# Patient Record
Sex: FEMALE | Race: WHITE | Employment: FULL TIME | URBAN - METROPOLITAN AREA
[De-identification: names, ages, dates, MRNs, and addresses within clinical notes are randomized per-mention and may not be internally consistent; named-entity substitution may affect disease eponyms.]

---
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
## 2024-04-29 RX ORDER — DIMENHYDRINATE 50 MG
TABLET ORAL
COMMUNITY

## 2024-04-29 RX ORDER — CETIRIZINE HYDROCHLORIDE 10 MG/1
TABLET ORAL
COMMUNITY

## 2024-04-29 RX ORDER — LORATADINE 10 MG/1
10 TABLET ORAL DAILY
COMMUNITY

## 2024-04-30 ENCOUNTER — OFFICE VISIT (OUTPATIENT)
Dept: ORTHOPEDIC SURGERY | Age: 19
End: 2024-04-30

## 2024-04-30 DIAGNOSIS — M67.00 TIGHTNESS OF HEEL CORD, UNSPECIFIED LATERALITY: ICD-10-CM

## 2024-04-30 DIAGNOSIS — M77.42 METATARSALGIA, LEFT FOOT: ICD-10-CM

## 2024-04-30 DIAGNOSIS — M79.672 LEFT FOOT PAIN: Primary | ICD-10-CM

## 2024-04-30 NOTE — PROGRESS NOTES
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 female presents with the above complaint. The symptoms began 2 monthsago and started without an injury.  She continues to play collegiate basketball.  The patient describes a sharp, aching pain that does not radiate.  The symptoms are intermittent  and are cycling since the onset.       Pain localizes to the plantar aspect of the forefoot and  is not predictably aggravated by weightbearing. There are not mechanical symptoms associated with the symptoms. There are notneuritic symptoms involving the foot or ankle. The patient denies subjective instability about the foot or ankle and denies new onset or progressive weakness of the lower extremity.    Pain level 6    The patient denies a pattern of activity related swelling.      Treatment to date:none    There is no no prior history of foot or ankle trauma on the left.  Past history significant for sesamoiditis on the right    There is no prior history of autoimmune disease, crystal arthropathy, or crystal arthropathy.       Past Medical History:      No past medical history on file.    No past surgical history on file.      Present Medications:         Current Outpatient Medications   Medication Sig Dispense Refill    cetirizine (ZYRTEC) 10 MG tablet       dimenhyDRINATE (DRAMAMINE) 50 MG tablet Take by mouth      loratadine (CLARITIN) 10 MG tablet Take 1 tablet by mouth daily      pantoprazole (PROTONIX) 40 MG tablet TAKE 1 TABLET BY MOUTH EVERY DAY FOR 30 DAYS      rifAXIMin (XIFAXAN) 550 MG tablet Take 1 tablet by mouth 3 times daily       No current facility-administered medications for this visit.         Allergies:      No

## 2024-06-11 ENCOUNTER — TELEPHONE (OUTPATIENT)
Dept: ORTHOPEDIC SURGERY | Age: 19
End: 2024-06-11

## 2024-06-11 ENCOUNTER — OFFICE VISIT (OUTPATIENT)
Dept: ORTHOPEDIC SURGERY | Age: 19
End: 2024-06-11
Payer: COMMERCIAL

## 2024-06-11 VITALS — WEIGHT: 175 LBS | HEIGHT: 72 IN | BODY MASS INDEX: 23.7 KG/M2

## 2024-06-11 DIAGNOSIS — G89.29 CHRONIC PAIN OF LEFT KNEE: Primary | ICD-10-CM

## 2024-06-11 DIAGNOSIS — S73.192A TEAR OF LEFT ACETABULAR LABRUM, INITIAL ENCOUNTER: ICD-10-CM

## 2024-06-11 DIAGNOSIS — M25.562 CHRONIC PAIN OF LEFT KNEE: Primary | ICD-10-CM

## 2024-06-11 DIAGNOSIS — M25.859 FEMORAL ACETABULAR IMPINGEMENT: ICD-10-CM

## 2024-06-11 DIAGNOSIS — M22.42 CHONDROMALACIA OF LEFT PATELLA: ICD-10-CM

## 2024-06-11 DIAGNOSIS — M25.552 LEFT HIP PAIN: ICD-10-CM

## 2024-06-11 PROCEDURE — G8427 DOCREV CUR MEDS BY ELIG CLIN: HCPCS | Performed by: INTERNAL MEDICINE

## 2024-06-11 PROCEDURE — 1036F TOBACCO NON-USER: CPT | Performed by: INTERNAL MEDICINE

## 2024-06-11 PROCEDURE — 99214 OFFICE O/P EST MOD 30 MIN: CPT | Performed by: INTERNAL MEDICINE

## 2024-06-11 PROCEDURE — G8420 CALC BMI NORM PARAMETERS: HCPCS | Performed by: INTERNAL MEDICINE

## 2024-06-11 RX ORDER — SACCHAROMYCES BOULARDII 250 MG
250 CAPSULE ORAL
COMMUNITY

## 2024-06-11 RX ORDER — ACETAMINOPHEN 500 MG
500 TABLET ORAL
COMMUNITY

## 2024-06-11 RX ORDER — MELOXICAM 15 MG/1
15 TABLET ORAL DAILY
Qty: 30 TABLET | Refills: 2 | Status: SHIPPED | OUTPATIENT
Start: 2024-06-11

## 2024-06-11 NOTE — PROGRESS NOTES
Chief Complaint:   Chief Complaint   Patient presents with    Knee Pain     Left Knee - Has been swelling daily for a year now. Icing every day.     Hip Pain     Left Hip - Clicking and painful. Doing home exercises every night from the .           History of Present Illness:       Patient is a 18 y.o. female presents with the above complaint. The symptoms of hip pain began hip pain began approximately 6 monthsago and started without an injury.  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      The patient describes a aching, tightness pain that does not radiate.  The symptoms are intermittent  and are are worsening since the onset.      Pain localizes in a C-sign distribution and  does not seems to follow  provacative pattern suggestive of greater trochanteric pain syndrome. There are mechanical symptoms that suggest a labral injury.  The patient admits to subjective instability about the hip and denies new onset or progressive weakness of the lower extremity.    Pain level 6    The patient denies a pattern of activity related swelling.      Treatment to date: ATC supervised exercises    There is no prior history of hip trauma.    There is no prior history of autoimmune disease, autoimmune disease, or crystal arthropathy.       Regarding the knee she has an established diagnosis of chondromalacia patella which was previously evaluated by me on 7/28/2021.  Workup included x-rays and treatment responded to conservative measures inclusive of functional knee bracing.      Pain localizes to the front side of the knee and  does seems to follow a typical patella femoral provacative pattern.  There are not mechanical symptoms that suggest meniscal injury.  The patient admits to subjective instability about the knee and denies new onset weakness of the lower extremity.    Pain level 6    The patient denies a pattern of activity related swelling.

## 2024-06-11 NOTE — TELEPHONE ENCOUNTER
Prescription Refill     Medication Name:  ANTI FLAMMATORY   Pharmacy: [id number] - [facility] [address] - P [phone] - F [phone]   Patient Contact Number:  [phone]     PT HAD AN APPT TODAY WITH KAYLA AN DISCUSS MEDICATION AND FORGOT TO MENTON WERE ABOUT'S OF MEDICATION AT THE END OF THE VISIT     PLEASE CALL BACK PT AT THE ABOVE NUMBER LISTED

## 2024-06-14 ENCOUNTER — TELEPHONE (OUTPATIENT)
Dept: ORTHOPEDIC SURGERY | Age: 19
End: 2024-06-14

## 2024-06-14 NOTE — TELEPHONE ENCOUNTER
LVM for pt that MRIs are approved for [facility] due to insurance restrictions, and they will need to call [facility] to schedule.

## 2024-06-14 NOTE — TELEPHONE ENCOUNTER
Faxing Patient Information       Contact Name: Laura Lawson   Contact Number: [phone]   Facility Fax Number:     THE PT'S MOM WOULD LIKE THE MRI ORDER AND AUTHORIZATION FAXED TO:    [facility] [place]  [phone]    [facility] [place] [phone]

## 2024-06-17 ENCOUNTER — TELEPHONE (OUTPATIENT)
Dept: ORTHOPEDIC SURGERY | Age: 19
End: 2024-06-17

## 2024-06-17 NOTE — TELEPHONE ENCOUNTER
S/W pt's mother, let her know that there is not a HIPAA release to speak to the pt's father regarding pt's medical info.  She requested that the MRI auth be sent to her email, at [email or web address]  I will email this to her today.  SHe v/u.    Regarding the creatine, I advised pt that Dr. Ervin is out of the office at this time, but that I did ask advice of Dr. Narayan, who states that it is safe to take both the creatine and the meloxicam, and there is not research to support that creatine causes kidney damage. She v/u.

## 2024-06-17 NOTE — TELEPHONE ENCOUNTER
General Question     Subject: QUESTIONS  Patient and /or Facility Request: JANE FLETCHER  Contact Number: [phone]    PATIENT'S FATHER CALLED TO ASK CLINICAL SOME QUESTIONS IN REGARD TO INSURANCE-MRI AND RX.     HE IS INQUIRING IF CLINICAL COULD EMAIL HIM APPROVAL FROM PATIENT'S INSURANCE FOR HER HIP MRI. EMAIL:[email or web address]  PATIENT WAS PRESCRIBED MELOXICAM AS AN ANTI FLAMMATORY AND ALSO TAKES CREATINE. THEY'VE READ THAT THE COMBINATION OF THE TWO RX COULD DAMAGE KIDNEYS OR LIVER. HE IS INQUIRING IF SHE COULD STOP TAKING CREATINE FOR A CERTAIN AMOUNT OF TIME.     PLEASE ADVISE

## 2024-06-24 ENCOUNTER — HOSPITAL ENCOUNTER (OUTPATIENT)
Age: 19
Discharge: HOME | End: 2024-06-24
Attending: INTERNAL MEDICINE
Payer: COMMERCIAL

## 2024-06-24 ENCOUNTER — TELEPHONE (OUTPATIENT)
Dept: ORTHOPEDIC SURGERY | Age: 19
End: 2024-06-24

## 2024-06-24 DIAGNOSIS — M22.42 CHONDROMALACIA OF LEFT PATELLA: ICD-10-CM

## 2024-06-24 DIAGNOSIS — M25.859 FEMORAL ACETABULAR IMPINGEMENT: ICD-10-CM

## 2024-06-24 DIAGNOSIS — G89.29 CHRONIC PAIN OF LEFT KNEE: ICD-10-CM

## 2024-06-24 DIAGNOSIS — M25.562 CHRONIC PAIN OF LEFT KNEE: ICD-10-CM

## 2024-06-24 DIAGNOSIS — M25.552 LEFT HIP PAIN: ICD-10-CM

## 2024-06-24 PROCEDURE — 73721 MRI JNT OF LWR EXTRE W/O DYE: CPT

## 2024-06-24 NOTE — TELEPHONE ENCOUNTER
Appointment Request     Patient requesting earlier appointment: No  Appointment offered to patient: MRI RESULTS   Patient Contact Number: [phone]     Pt HAD MRI DONE TODAY 07/24  AND THE SOONEST APPT IS NEXT TUES.     THEY WOULD LIKE THE RESULTS ASAP AND WILL GO TO EITHER LOCATION IF THEY COULD BE SEEN SOONER THAN NEXT WEEK.     PLEASE ADVISE IF APPT CAN BE MOVED UP TO THIS WEEK.

## 2024-06-27 ENCOUNTER — OFFICE VISIT (OUTPATIENT)
Dept: ORTHOPEDIC SURGERY | Age: 19
End: 2024-06-27
Payer: COMMERCIAL

## 2024-06-27 VITALS — WEIGHT: 175 LBS | HEIGHT: 72 IN | BODY MASS INDEX: 23.7 KG/M2

## 2024-06-27 DIAGNOSIS — M22.42 CHONDROMALACIA OF LEFT PATELLA: Primary | ICD-10-CM

## 2024-06-27 DIAGNOSIS — S73.192S TEAR OF LEFT ACETABULAR LABRUM, SEQUELA: ICD-10-CM

## 2024-06-27 DIAGNOSIS — M25.859 FEMORAL ACETABULAR IMPINGEMENT: ICD-10-CM

## 2024-06-27 PROCEDURE — L1812 KO ELASTIC W/JOINTS PRE OTS: HCPCS | Performed by: INTERNAL MEDICINE

## 2024-06-27 PROCEDURE — 99214 OFFICE O/P EST MOD 30 MIN: CPT | Performed by: INTERNAL MEDICINE

## 2024-06-27 PROCEDURE — G8420 CALC BMI NORM PARAMETERS: HCPCS | Performed by: INTERNAL MEDICINE

## 2024-06-27 PROCEDURE — 1036F TOBACCO NON-USER: CPT | Performed by: INTERNAL MEDICINE

## 2024-06-27 PROCEDURE — G8427 DOCREV CUR MEDS BY ELIG CLIN: HCPCS | Performed by: INTERNAL MEDICINE

## 2024-06-28 ENCOUNTER — TELEPHONE (OUTPATIENT)
Dept: ORTHOPEDIC SURGERY | Age: 19
End: 2024-06-28

## 2024-06-28 NOTE — TELEPHONE ENCOUNTER
PATIENTS FATHER CALLED IN STATING THE PATIENT HAD ANOTHER MRI READING TODDAY AND THEY WERE GIVEN MORE INFO AND ANOTHER TEAR WAS FOUND SO HE WOULD LIKE TO  KNOW IF THIS CHANGE THE PLAN OF CARE MOVING FORWARD DUE TO NEW FOUND INFO    PLEASE CALL JANE [phone]

## 2024-07-02 NOTE — PROGRESS NOTES
Chief Complaint:   Chief Complaint   Patient presents with    Hip Pain     Left hip/ Left knee - MRI results today. No changes. Not taking the Meloxicam.          History of Present Illness:       Patient is a 18 y.o. female returns follow up for the above complaint. The patient was last seen approximately 2 weeksago. The symptoms show no change since the last visit. The patient has had further testing for the problem.      MRI of the left hip and left knee completed in the interim    Symptoms of left hip pain continue to follow JYOTI provocative pattern    Left knee pain continues to follow patellofemoral provocative pattern    Pain levels 2/10    She elected not to start a course of meloxicam    She denies any subjective instability about the knee or mechanical symptom at the knee.  She denies any subjective instability or mechanical symptoms at the hip.    Accompanied by her father in the office today     Past Medical History:      No past medical history on file.     Present Medications:         Current Outpatient Medications   Medication Sig Dispense Refill    acetaminophen (TYLENOL) 500 MG tablet Take 1 tablet by mouth      saccharomyces boulardii (FLORASTOR) 250 MG capsule Take 1 capsule by mouth      meloxicam (MOBIC) 15 MG tablet Take 1 tablet by mouth daily For 2 weeks then daily as needed thereafter 30 tablet 2    cetirizine (ZYRTEC) 10 MG tablet       dimenhyDRINATE (DRAMAMINE) 50 MG tablet Take by mouth      loratadine (CLARITIN) 10 MG tablet Take 1 tablet by mouth daily      pantoprazole (PROTONIX) 40 MG tablet TAKE 1 TABLET BY MOUTH EVERY DAY FOR 30 DAYS      rifAXIMin (XIFAXAN) 550 MG tablet Take 1 tablet by mouth 3 times daily       No current facility-administered medications for this visit.         Allergies:      No Known Allergies        Review of Systems:    Pertinent items are noted in HPI        Vital Signs:    There were no vitals filed for this visit.     General Exam:     Constitutional:

## 2024-07-08 ENCOUNTER — TELEPHONE (OUTPATIENT)
Dept: ORTHOPEDIC SURGERY | Age: 19
End: 2024-07-08

## 2024-07-08 ENCOUNTER — HOSPITAL ENCOUNTER (OUTPATIENT)
Dept: PHYSICAL THERAPY | Age: 19
Setting detail: THERAPIES SERIES
Discharge: HOME | End: 2024-07-08
Payer: COMMERCIAL

## 2024-07-08 DIAGNOSIS — M25.552 LEFT HIP PAIN: ICD-10-CM

## 2024-07-08 DIAGNOSIS — M25.562 ACUTE PAIN OF LEFT KNEE: Primary | ICD-10-CM

## 2024-07-08 PROCEDURE — 97161 PT EVAL LOW COMPLEX 20 MIN: CPT

## 2024-07-08 PROCEDURE — 97110 THERAPEUTIC EXERCISES: CPT

## 2024-07-08 PROCEDURE — 97140 MANUAL THERAPY 1/> REGIONS: CPT

## 2024-07-08 NOTE — PLAN OF CARE
lymphatic drainage, manual traction) for the purpose of modulating pain, promoting relaxation,  increasing ROM, reducing/eliminating soft tissue swelling/inflammation/restriction, improving soft tissue extensibility and allowing for proper ROM for normal function with self care, mobility, lifting and ambulation    GOALS     Patient stated goal: \"To get back to playing basketball this season\"  [] Progressing: [] Met: [] Not Met: [] Adjusted    Therapist goals for Patient:   Short Term Goals: To be achieved in: 2 weeks  1. Independent in HEP and progression per patient tolerance, in order to prevent re-injury.   [] Progressing: [] Met: [] Not Met: [] Adjusted  2. Patient will have a decrease in pain to <2/10 to facilitate improvement in movement, function, and ADLs as indicated by Functional Deficits.  [] Progressing: [] Met: [] Not Met: [] Adjusted    Long Term Goals: To be achieved in: 6 weeks  1. Disability index score of 10% or less for the LEFS to assist with reaching prior level of function with activities such as squatting.  [] Progressing: [] Met: [] Not Met: [] Adjusted  2. Patient will demonstrate increased AROM of L hip to WNL without pain to allow for proper joint functioning to enable patient to squat with appropriate mechanics.   [] Progressing: [] Met: [] Not Met: [] Adjusted  3. Patient will demonstrate increased Strength of L proximal hip to at least 5/5 throughout without pain to allow for proper functional mobility to enable patient to return to sporting/dynamic activities.   [] Progressing: [] Met: [] Not Met: [] Adjusted  4. Patient will return to squatting without increased symptoms or restriction.   [] Progressing: [] Met: [] Not Met: [] Adjusted  5. Patient will be able to play basketball (dribble down court) without increased symptoms or restriction.     [] Progressing: [] Met: [] Not Met: [] Adjusted     Overall Progression Towards Functional goals/ Treatment Progress Update:  [] Patient is

## 2024-07-08 NOTE — TELEPHONE ENCOUNTER
LVM for pt to call us back with a good time to reach her to schedule PRP.  Next available PRP date for scheduling is 7/29/24.

## 2024-07-08 NOTE — TELEPHONE ENCOUNTER
Surgery and/or Procedure Scheduling     Contact Name: JANE FLETCHER  Surgical/Procedure Request: PRP INJECTION LEFT HIP  Patient Contact Number: [phone]

## 2024-07-09 ENCOUNTER — TELEPHONE (OUTPATIENT)
Dept: ORTHOPEDIC SURGERY | Age: 19
End: 2024-07-09

## 2024-07-09 NOTE — TELEPHONE ENCOUNTER
General Question     Subject: PRP APPT  Patient and /or Facility Request: Laura Lawson   Contact Number: [phone]    PATIENT MOTHER CALLED REGARDING SPEAKING WITH HENRY OR FRANCIS ABOUT THE PATIENT PRP APPT    SHE WAS INFORMED TO CALL BACK IN ABOUT 10 MINS DUE TO THE PROVIDERS BEING AT [place]     PLEASE CALL BACK THE ABOVE NUMBER

## 2024-07-10 ENCOUNTER — TELEPHONE (OUTPATIENT)
Dept: ORTHOPEDIC SURGERY | Age: 19
End: 2024-07-10

## 2024-07-10 ENCOUNTER — HOSPITAL ENCOUNTER (OUTPATIENT)
Dept: PHYSICAL THERAPY | Age: 19
Setting detail: THERAPIES SERIES
Discharge: HOME | End: 2024-07-10
Payer: COMMERCIAL

## 2024-07-10 PROCEDURE — 97530 THERAPEUTIC ACTIVITIES: CPT | Performed by: SPECIALIST/TECHNOLOGIST

## 2024-07-10 PROCEDURE — 97140 MANUAL THERAPY 1/> REGIONS: CPT | Performed by: SPECIALIST/TECHNOLOGIST

## 2024-07-10 PROCEDURE — 97110 THERAPEUTIC EXERCISES: CPT | Performed by: SPECIALIST/TECHNOLOGIST

## 2024-07-10 NOTE — FLOWSHEET NOTE
surgery.  (43859) MANUAL THERAPY -  Manual therapy techniques, 1 or more regions, each 15 minutes (Mobilization/manipulation, manual lymphatic drainage, manual traction) for the purpose of modulating pain, promoting relaxation,  increasing ROM, reducing/eliminating soft tissue swelling/inflammation/restriction, improving soft tissue extensibility and allowing for proper ROM for normal function with self care, mobility, lifting and ambulation    GOALS     Patient stated goal: \"To get back to playing basketball this season\"  [] Progressing: [] Met: [] Not Met: [] Adjusted    Therapist goals for Patient:   Short Term Goals: To be achieved in: 2 weeks  1. Independent in HEP and progression per patient tolerance, in order to prevent re-injury.   [] Progressing: [] Met: [] Not Met: [] Adjusted  2. Patient will have a decrease in pain to <2/10 to facilitate improvement in movement, function, and ADLs as indicated by Functional Deficits.  [] Progressing: [] Met: [] Not Met: [] Adjusted    Long Term Goals: To be achieved in: 6 weeks  1. Disability index score of 10% or less for the LEFS to assist with reaching prior level of function with activities such as squatting.  [] Progressing: [] Met: [] Not Met: [] Adjusted  2. Patient will demonstrate increased AROM of L hip to WNL without pain to allow for proper joint functioning to enable patient to squat with appropriate mechanics.   [] Progressing: [] Met: [] Not Met: [] Adjusted  3. Patient will demonstrate increased Strength of L proximal hip to at least 5/5 throughout without pain to allow for proper functional mobility to enable patient to return to sporting/dynamic activities.   [] Progressing: [] Met: [] Not Met: [] Adjusted  4. Patient will return to squatting without increased symptoms or restriction.   [] Progressing: [] Met: [] Not Met: [] Adjusted  5. Patient will be able to play basketball (dribble down court) without increased symptoms or restriction.     []

## 2024-07-11 ENCOUNTER — OFFICE VISIT (OUTPATIENT)
Dept: ORTHOPEDIC SURGERY | Age: 19
End: 2024-07-11

## 2024-07-11 VITALS — HEIGHT: 72 IN | WEIGHT: 175.04 LBS | BODY MASS INDEX: 23.71 KG/M2

## 2024-07-11 DIAGNOSIS — S73.192S TEAR OF LEFT ACETABULAR LABRUM, SEQUELA: ICD-10-CM

## 2024-07-11 DIAGNOSIS — M25.859 FEMORAL ACETABULAR IMPINGEMENT: Primary | ICD-10-CM

## 2024-07-11 RX ORDER — BUPIVACAINE HYDROCHLORIDE 2.5 MG/ML
3 INJECTION, SOLUTION INFILTRATION; PERINEURAL ONCE
Status: COMPLETED | OUTPATIENT
Start: 2024-07-11 | End: 2024-07-11

## 2024-07-11 RX ORDER — LIDOCAINE HYDROCHLORIDE 10 MG/ML
5 INJECTION, SOLUTION INFILTRATION; PERINEURAL ONCE
Status: COMPLETED | OUTPATIENT
Start: 2024-07-11 | End: 2024-07-11

## 2024-07-11 RX ADMIN — LIDOCAINE HYDROCHLORIDE 5 ML: 10 INJECTION, SOLUTION INFILTRATION; PERINEURAL at 11:46

## 2024-07-11 RX ADMIN — BUPIVACAINE HYDROCHLORIDE 7.5 MG: 2.5 INJECTION, SOLUTION INFILTRATION; PERINEURAL at 11:46

## 2024-07-11 NOTE — PROGRESS NOTES
quadriceps enthesopathy and tendinopathy without tear. 4. No meniscal tear. Thank you for the opportunity to provide your interpretation. Rupesh Garcia MD A: ADDISON 07/10/2024 4:19 PM EST             Assessment   Impression: .    Encounter Diagnoses   Name Primary?    Femoral acetabular impingement Yes    Tear of left acetabular labrum, sequela               Plan:     Postinjection protocol active rest for the next 72 hours  Resume  course of PT next appointment Monday and transition to [facility]  Activity modification -JYOTI precautions and caution against overuse  Medical pain management: Strict avoidance of NSAIDs for the next 2 weeks            Orders:        Orders Placed This Encounter   Procedures    US MISC LIMITED     Order Specific Question:   Reason for exam:     Answer:   KAR Ervin MD.      Disclaimer:    \"This note was dictated with voice recognition software. Though review and correction are routine, we apologize for any errors.\"

## 2024-07-15 ENCOUNTER — HOSPITAL ENCOUNTER (OUTPATIENT)
Dept: PHYSICAL THERAPY | Age: 19
Setting detail: THERAPIES SERIES
Discharge: HOME | End: 2024-07-15
Payer: COMMERCIAL

## 2024-07-15 PROCEDURE — 97110 THERAPEUTIC EXERCISES: CPT

## 2024-07-15 PROCEDURE — 97140 MANUAL THERAPY 1/> REGIONS: CPT

## 2024-07-15 PROCEDURE — 97530 THERAPEUTIC ACTIVITIES: CPT

## 2024-07-15 NOTE — FLOWSHEET NOTE
improve muscular strength or increase flexibility, following either an injury or surgery.  (13309) MANUAL THERAPY -  Manual therapy techniques, 1 or more regions, each 15 minutes (Mobilization/manipulation, manual lymphatic drainage, manual traction) for the purpose of modulating pain, promoting relaxation,  increasing ROM, reducing/eliminating soft tissue swelling/inflammation/restriction, improving soft tissue extensibility and allowing for proper ROM for normal function with self care, mobility, lifting and ambulation    GOALS     Patient stated goal: \"To get back to playing basketball this season\"  [] Progressing: [] Met: [] Not Met: [] Adjusted    Therapist goals for Patient:   Short Term Goals: To be achieved in: 2 weeks  1. Independent in HEP and progression per patient tolerance, in order to prevent re-injury.   [] Progressing: [] Met: [] Not Met: [] Adjusted  2. Patient will have a decrease in pain to <2/10 to facilitate improvement in movement, function, and ADLs as indicated by Functional Deficits.  [] Progressing: [] Met: [] Not Met: [] Adjusted    Long Term Goals: To be achieved in: 6 weeks  1. Disability index score of 10% or less for the LEFS to assist with reaching prior level of function with activities such as squatting.  [] Progressing: [] Met: [] Not Met: [] Adjusted  2. Patient will demonstrate increased AROM of L hip to WNL without pain to allow for proper joint functioning to enable patient to squat with appropriate mechanics.   [] Progressing: [] Met: [] Not Met: [] Adjusted  3. Patient will demonstrate increased Strength of L proximal hip to at least 5/5 throughout without pain to allow for proper functional mobility to enable patient to return to sporting/dynamic activities.   [] Progressing: [] Met: [] Not Met: [] Adjusted  4. Patient will return to squatting without increased symptoms or restriction.   [] Progressing: [] Met: [] Not Met: [] Adjusted  5. Patient will be able to play

## 2024-07-17 ENCOUNTER — HOSPITAL ENCOUNTER (OUTPATIENT)
Dept: PHYSICAL THERAPY | Age: 19
Setting detail: THERAPIES SERIES
Discharge: HOME | End: 2024-07-17
Payer: COMMERCIAL

## 2024-07-17 PROCEDURE — 97140 MANUAL THERAPY 1/> REGIONS: CPT | Performed by: SPECIALIST/TECHNOLOGIST

## 2024-07-17 PROCEDURE — 97110 THERAPEUTIC EXERCISES: CPT | Performed by: SPECIALIST/TECHNOLOGIST

## 2024-07-17 NOTE — FLOWSHEET NOTE
[facility] - [facility] [address], [place] [address] office: [phone] fax: [phone]           Physical Therapy: TREATMENT/PROGRESS NOTE   Patient: Laura Lawson (18 y.o. female)   Examination Date: 2024   :  2005 MRN: [id number]   Visit #: 4   Insurance Allowable Auth Needed   20 []Yes    [x]No    Insurance: Payor: [employer] / Plan: [employer] [address] / Product Type: *No Product type* /   Insurance ID: [id number] - (Commercial)  Secondary Insurance (if applicable):    Treatment Diagnosis:   No diagnosis found.     Medical Diagnosis:  Chondromalacia of left patella [M22.42]  Femoral acetabular impingement [M25.859]   Referring Physician: Felice Ervin*  PCP: Zeynep Chua MD     Plan of care signed (Y/N):     Date of Patient follow up with Physician: 24     Progress Report/POC: NO  POC update due: (10 visits /OR AUTH LIMITS, whichever is less)  2024                                             Precautions/ Contra-indications:           Latex allergy:  NO  Pacemaker:    NO  Contraindications for Manipulation: None  Date of Surgery: N/A  Other:    Red Flags:  None    C-SSRS Triggered by Intake questionnaire:   Patient answered 'NO' to both behavioral questions on intake.  No further screening warranted    Preferred Language for Healthcare:   [x] English       [] other:    SUBJECTIVE EXAMINATION     Patient stated complaint:  Pt.  reports that her LB on Rt side the last couple of weeks. Pt states her LB has been more sore related to job demands and picking up boxes etc. Pt reports that her left hip has continued to be sore. Is really busy with work and standing etc. Presently only working up to 6# hours but is standing and bending over a lot etc.      Pt reports that she had PRP injection in L hip last Thursday. Pt reports that she had increased anterior and posterior hip discomfort

## 2024-07-22 ENCOUNTER — HOSPITAL ENCOUNTER (OUTPATIENT)
Dept: PHYSICAL THERAPY | Age: 19
Setting detail: THERAPIES SERIES
Discharge: HOME | End: 2024-07-22
Payer: COMMERCIAL

## 2024-07-22 DIAGNOSIS — G89.29 CHRONIC PAIN OF LEFT KNEE: ICD-10-CM

## 2024-07-22 DIAGNOSIS — M25.552 LEFT HIP PAIN: Primary | ICD-10-CM

## 2024-07-22 DIAGNOSIS — M25.562 CHRONIC PAIN OF LEFT KNEE: ICD-10-CM

## 2024-07-22 PROCEDURE — 97110 THERAPEUTIC EXERCISES: CPT

## 2024-07-22 PROCEDURE — 20560 NDL INSJ W/O NJX 1 OR 2 MUSC: CPT

## 2024-07-22 PROCEDURE — 97530 THERAPEUTIC ACTIVITIES: CPT

## 2024-07-22 PROCEDURE — 97140 MANUAL THERAPY 1/> REGIONS: CPT

## 2024-07-22 NOTE — FLOWSHEET NOTE
[facility] - Outpatient Rehabilitation [facility] Therapy [address], [place] [address] office: [phone] fax: [phone]           Physical Therapy: TREATMENT/PROGRESS NOTE   Patient: Laura Lawson (18 y.o. female)   Examination Date: 2024   :  2005 MRN: [id number]   Visit #: 5   Insurance Allowable Auth Needed   20 []Yes    [x]No    Insurance: Payor: [employer] / Plan: [employer] [address] / Product Type: *No Product type* /   Insurance ID: [id number] - (Commercial)  Secondary Insurance (if applicable):    Treatment Diagnosis:     ICD-10-CM    1. Left hip pain  M25.552       2. 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  No further screening warranted    Preferred Language for Healthcare:   [x] English       [] other:    SUBJECTIVE EXAMINATION     Patient stated complaint: Pt reports that her hip is feeling a little better this week, but her medial knee is bothering her more today.  Pt reports that she has started doing more dribbling and shooting drills to prepare for basketball, so unsure if this is what caused her to have increased knee pain or not.        Test used Initial score  2024   Pain Summary VAS 2-7/10 3/ hip   4/10 knee   Functional

## 2024-07-23 ENCOUNTER — OFFICE VISIT (OUTPATIENT)
Dept: ORTHOPEDIC SURGERY | Age: 19
End: 2024-07-23
Payer: COMMERCIAL

## 2024-07-23 VITALS — HEIGHT: 72 IN | WEIGHT: 175 LBS | BODY MASS INDEX: 23.7 KG/M2

## 2024-07-23 DIAGNOSIS — S46.011A STRAIN OF RIGHT ROTATOR CUFF CAPSULE, INITIAL ENCOUNTER: ICD-10-CM

## 2024-07-23 DIAGNOSIS — M25.859 FEMORAL ACETABULAR IMPINGEMENT: ICD-10-CM

## 2024-07-23 DIAGNOSIS — S73.192A TEAR OF LEFT ACETABULAR LABRUM, INITIAL ENCOUNTER: ICD-10-CM

## 2024-07-23 DIAGNOSIS — M25.511 RIGHT SHOULDER PAIN, UNSPECIFIED CHRONICITY: Primary | ICD-10-CM

## 2024-07-23 PROCEDURE — 1036F TOBACCO NON-USER: CPT | Performed by: INTERNAL MEDICINE

## 2024-07-23 PROCEDURE — G8420 CALC BMI NORM PARAMETERS: HCPCS | Performed by: INTERNAL MEDICINE

## 2024-07-23 PROCEDURE — G8427 DOCREV CUR MEDS BY ELIG CLIN: HCPCS | Performed by: INTERNAL MEDICINE

## 2024-07-23 PROCEDURE — 99214 OFFICE O/P EST MOD 30 MIN: CPT | Performed by: INTERNAL MEDICINE

## 2024-07-23 NOTE — PROGRESS NOTES
Chief Complaint:   Chief Complaint   Patient presents with    Shoulder Pain     Right Shoulder - Randomly started hurting a couple of weeks ago after workouts. Pain when raising her arm over her head. More pain than weakness when lifting things.          History of Present Illness:       Patient is a 18 y.o. female returns follow up for the above complaint.  The symptoms started approximately 3 weeks ago and brought up any specific injury or event.  She has been recreationally active in a off-season workout routine for basketball inclusive of shooting drills and upper extremity weight training.    Current level of participation in basketball is being restricted for treatment of her left hip JYOTI and labral tear status post recent ultrasound-guided PRP injection under my supervision.    The symptoms of right shoulder pain do show a typical rotator cuff provacative pattern. The pain is  anterior about the shoulder.There is occasional associated mechanical symptoms localizing to the shoulder. The patient denies symptoms of instability about the shoulder.     Treatment to date has included nothing specific.      Pain levels 4.    The symptoms do not correlate with head and neck movement. The patient denies new onset weakness of the upper extremity.    The patient does have history of prior shoulder trauma low-grade AC joint sprain.     Workup has included: None    There is no history or autoimmune disease, inflammatory arthropathy or crystal arthropathy.    With respect to the left hip the pain is improved status post ultrasound-guided intra-articular [id number] 7/11/2024.         Past Medical History:      No past medical history on file.     Present Medications:         Current Outpatient Medications   Medication Sig Dispense Refill    acetaminophen (TYLENOL) 500 MG tablet Take 1 tablet by mouth      saccharomyces boulardii (FLORASTOR) 250 MG capsule Take 1 capsule by mouth      meloxicam (MOBIC) 15 MG tablet

## 2024-07-24 ENCOUNTER — HOSPITAL ENCOUNTER (OUTPATIENT)
Dept: PHYSICAL THERAPY | Age: 19
Setting detail: THERAPIES SERIES
Discharge: HOME | End: 2024-07-24
Payer: COMMERCIAL

## 2024-07-24 PROCEDURE — 97110 THERAPEUTIC EXERCISES: CPT | Performed by: SPECIALIST/TECHNOLOGIST

## 2024-07-24 PROCEDURE — 97140 MANUAL THERAPY 1/> REGIONS: CPT | Performed by: SPECIALIST/TECHNOLOGIST

## 2024-07-24 NOTE — FLOWSHEET NOTE
achieved in: 2 weeks  1. Independent in HEP and progression per patient tolerance, in order to prevent re-injury.   [] Progressing: [] Met: [] Not Met: [] Adjusted  2. Patient will have a decrease in pain to <2/10 to facilitate improvement in movement, function, and ADLs as indicated by Functional Deficits.  [] Progressing: [] Met: [] Not Met: [] Adjusted    Long Term Goals: To be achieved in: 6 weeks  1. Disability index score of 10% or less for the LEFS to assist with reaching prior level of function with activities such as squatting.  [] Progressing: [] Met: [] Not Met: [] Adjusted  2. Patient will demonstrate increased AROM of L hip to WNL without pain to allow for proper joint functioning to enable patient to squat with appropriate mechanics.   [] Progressing: [] Met: [] Not Met: [] Adjusted  3. Patient will demonstrate increased Strength of L proximal hip to at least 5/5 throughout without pain to allow for proper functional mobility to enable patient to return to sporting/dynamic activities.   [] Progressing: [] Met: [] Not Met: [] Adjusted  4. Patient will return to squatting without increased symptoms or restriction.   [] Progressing: [] Met: [] Not Met: [] Adjusted  5. Patient will be able to play basketball (dribble down court) without increased symptoms or restriction.     [] Progressing: [] Met: [] Not Met: [] Adjusted     Overall Progression Towards Functional goals/ Treatment Progress Update:  [] Patient is progressing as expected towards functional goals listed.    [] Progression is slowed due to complexities/Impairments listed.  [] Progression has been slowed due to co-morbidities.  [x] Plan just implemented, too soon (<30days) to assess goals progression   [] Goals require adjustment due to lack of progress  [] Patient is not progressing as expected and requires additional follow up with physician  [] Other:     TREATMENT PLAN     Frequency/Duration: 2x/week for 6 weeks for the following treatment

## 2024-08-01 ENCOUNTER — HOSPITAL ENCOUNTER (OUTPATIENT)
Dept: PHYSICAL THERAPY | Age: 19
Setting detail: THERAPIES SERIES
Discharge: HOME | End: 2024-08-01
Payer: COMMERCIAL

## 2024-08-01 DIAGNOSIS — R52 PAIN: ICD-10-CM

## 2024-08-01 DIAGNOSIS — R53.1 WEAKNESS: ICD-10-CM

## 2024-08-01 DIAGNOSIS — M25.60 DECREASED RANGE OF MOTION: Primary | ICD-10-CM

## 2024-08-01 PROCEDURE — 97161 PT EVAL LOW COMPLEX 20 MIN: CPT

## 2024-08-01 PROCEDURE — 97110 THERAPEUTIC EXERCISES: CPT

## 2024-08-01 NOTE — PLAN OF CARE
[facility] - Outpatient Rehabilitation and Therapy [address], [place] [address] office: [phone] fax: [phone]     Physical Therapy Initial Evaluation Certification      Dear Felice Ervin*,    We had the pleasure of evaluating the following patient for physical therapy services at [facility].  A summary of our findings can be found in the initial assessment below.  This includes our plan of care.  If you have any questions or concerns regarding these findings, please do not hesitate to contact me at the office phone number listed above.  Thank you for the referral.     Physician Signature:_______________________________Date:__________________  By signing above (or electronic signature), therapist’s plan is approved by physician       Physical Therapy: TREATMENT/PROGRESS NOTE   Patient: Laura Lawson (18 y.o. female)   Examination Date: 2024   :  2005 MRN: [id number]   Visit #: 1   Insurance Allowable Auth Needed   20 PCY []Yes    [x]No    Insurance: Payor: [employer] / Plan: MED[employer] [address] / Product Type: *No Product type* /   Insurance ID: [id number] - (Commercial)  Secondary Insurance (if applicable):    Treatment Diagnosis:     ICD-10-CM    1. Decreased range of motion  M25.60       2. Pain  R52       3. Weakness  R53.1          Medical Diagnosis:  Right shoulder pain, unspecified chronicity [M25.511]  Strain of right rotator cuff capsule, initial encounter [S46.011A]   Referring Physician: Felice Ervin*  PCP: Zeynep Chua MD     Plan of care signed (Y/N):     Date of Patient follow up with Physician: 8/15/24     Plan of Care Report: EVAL today  POC update due: (10 visits /OR AUTH LIMITS, whichever is less)  2024                                             Precautions/ Contra-indications:

## 2024-08-05 ENCOUNTER — HOSPITAL ENCOUNTER (OUTPATIENT)
Dept: PHYSICAL THERAPY | Age: 19
Setting detail: THERAPIES SERIES
Discharge: HOME | End: 2024-08-05
Payer: COMMERCIAL

## 2024-08-05 PROCEDURE — 97110 THERAPEUTIC EXERCISES: CPT

## 2024-08-05 PROCEDURE — 97112 NEUROMUSCULAR REEDUCATION: CPT

## 2024-08-05 PROCEDURE — 20560 NDL INSJ W/O NJX 1 OR 2 MUSC: CPT

## 2024-08-05 NOTE — FLOWSHEET NOTE
[facility] - Outpatient Rehabilitation and Therapy [address], [place] [address] office: [phone] fax: [phone]           Physical Therapy: TREATMENT/PROGRESS NOTE   Patient: Laura Lawson (18 y.o. female)   Examination Date: 2024   :  2005 MRN: [id number]   Visit #: 8   Insurance Allowable Auth Needed   20 []Yes    [x]No    Insurance: Payor: [employer] / Plan: MED[employer] M[employer]UAL [address] / Product Type: *No Product type* /   Insurance ID: [id number] - (Commercial)  Secondary Insurance (if applicable):    Treatment Diagnosis:     ICD-10-CM    1. Left hip pain  M25.552       2. 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  No further screening warranted    Preferred Language for Healthcare:   [x] English       [] other:    SUBJECTIVE EXAMINATION     Patient stated complaint: Pt reports last week, hip was much better than before. Yesterday pain was bad, but it is better today.     Test used Initial score  2024   Pain Summary VAS 2-7/10 2-3/ hip   4/10 knee   Functional questionnaire LEFS 61/80; 24% disability    Other:                OBJECTIVE EXAMINATION     24     PROM AROM    L R L R   Hip Flexion   110 *pn 110   Hip Abduction       Hip ER

## 2024-08-05 NOTE — FLOWSHEET NOTE
[facility] - Outpatient Rehabilitation and Therapy [address], [place] [address] office: [phone] fax: [phone]         Physical Therapy: TREATMENT/PROGRESS NOTE   Patient: Laura Lawson (18 y.o. female)   Examination Date: 2024   :  2005 MRN: [id number]   Visit #: 2   Insurance Allowable Auth Needed   20 PCY []Yes    [x]No    Insurance: Payor: [employer] / Plan: MED[employer] M[employer]UAL [address] / Product Type: *No Product type* /   Insurance ID: [id number] - (Commercial)  Secondary Insurance (if applicable):    Treatment Diagnosis:     ICD-10-CM    1. Decreased range of motion  M25.60       2. Pain  R52       3. Weakness  R53.1          Medical Diagnosis:  Right shoulder pain, unspecified chronicity [M25.511]  Strain of right rotator cuff capsule, initial encounter [S46.011A]   Referring Physician: Felice Ervin*  PCP: Zeynep Chua MD     Plan of care signed (Y/N): Y    Date of Patient follow up with Physician: 8/15/24     Plan of Care Report: NO  POC update due: (10 visits /OR AUTH LIMITS, whichever is less)  2024                                             Precautions/ Contra-indications:                                                                                         Latex allergy:  NO  Pacemaker:    NO  Contraindications for Manipulation: None  Date of Surgery: N/A  Other:     Red Flags:  None     C-SSRS Triggered by Intake questionnaire:   Patient answered 'NO' to both behavioral questions on intake.  No further screening warranted    Preferred Language for Healthcare:   [x] English       [] other:    SUBJECTIVE EXAMINATION     Subjective:   Pt reports her shoulder is doing okay, no significant complaints.    Patient stated complaint: Pt reports insidious onset ~ 2-3 weeks ago of R shoulder pain on top of shoulder and along deltoid. Hurting with [place] movement, pushing and pulling. Pops when doing [place] activity, some

## 2024-08-07 ENCOUNTER — HOSPITAL ENCOUNTER (OUTPATIENT)
Dept: PHYSICAL THERAPY | Age: 19
Setting detail: THERAPIES SERIES
Discharge: HOME | End: 2024-08-07
Payer: COMMERCIAL

## 2024-08-07 PROCEDURE — 97110 THERAPEUTIC EXERCISES: CPT | Performed by: SPECIALIST/TECHNOLOGIST

## 2024-08-07 PROCEDURE — 97530 THERAPEUTIC ACTIVITIES: CPT | Performed by: SPECIALIST/TECHNOLOGIST

## 2024-08-07 PROCEDURE — 97112 NEUROMUSCULAR REEDUCATION: CPT | Performed by: SPECIALIST/TECHNOLOGIST

## 2024-08-07 NOTE — FLOWSHEET NOTE
[facility] - Outpatient Rehabilitation and Therapy [address], [place] [address] office: [phone] fax: [phone]         Physical Therapy: TREATMENT/PROGRESS NOTE   Patient: Laura Lawson (18 y.o. female)   Examination Date: 2024   :  2005 MRN: [id number]   Visit #: 8   Insurance Allowable Auth Needed   20 PCY []Yes    [x]No    Insurance: Payor: [employer] / Plan: MED[employer] M[employer]UAL [address] / Product Type: *No Product type* /   Insurance ID: [id number] - (Commercial)  Secondary Insurance (if applicable):    Treatment Diagnosis:     ICD-10-CM    1. Decreased range of motion  M25.60       2. Pain  R52       3. Weakness  R53.1          Medical Diagnosis:  Right shoulder pain, unspecified chronicity [M25.511]  Strain of right rotator cuff capsule, initial encounter [S46.011A]   Referring Physician: Felice Ervin*  PCP: Zeynep Chua MD     Plan of care signed (Y/N): Y    Date of Patient follow up with Physician: 8/15/24     Plan of Care Report: NO  POC update due: (10 visits /OR AUTH LIMITS, whichever is less)  2024                                             Precautions/ Contra-indications:                                                                                         Latex allergy:  NO  Pacemaker:    NO  Contraindications for Manipulation: None  Date of Surgery: N/A  Other:     Red Flags:  None     C-SSRS Triggered by Intake questionnaire:   Patient answered 'NO' to both behavioral questions on intake.  No further screening warranted    Preferred Language for Healthcare:   [x] English       [] other:    SUBJECTIVE EXAMINATION     Subjective:   Pt. Reports having some anterior shoulder pain. Aug 26 first day of class. Shoulder is doing well. Attempting to decrease amount of planks and [place] as able. When she returns for BB at schoold    Patient stated complaint: Pt reports insidious onset ~ 2-3 weeks ago of R shoulder pain 
days when she goes back to [facility] next week.       Medical Necessity Documentation:  I certify that this patient meets the below criteria necessary for medical necessity for care and/or justification of therapy services:  The patient has functional impairments and/or activity limitations and would benefit from continued outpatient therapy services to address the deficits outlined in the patients goals    Return to Play: NA    Prognosis for POC: [x] Good [] Fair  [] Poor    Patient requires continued skilled intervention: [x] Yes  [] No      CHARGE CAPTURE     PT CHARGE GRID   CPT Code (TIMED) minutes # CPT Code (UNTIMED) #     Therex (43917)  25 2  EVAL:LOW (17824 - Typically 20 minutes face-to-face)     Neuromusc. Re-ed (51076)    Re-Eval (71101)     Manual (94313)    Estim Unattended (41913)     Ther. Act (51791) 10 1  Mech. Traction (15900)     Gait (57970)    Dry Needle 1-2 muscle (93992) 1    Aquatic Therex (28630)    Dry Needle 3+ muscle (20561)     Iontophoresis (52660)    VASO (51763)     Ultrasound (68256)    Group Therapy (40225)     Estim Attended (94979)    Canalith Repositioning (98843)     Other:    Other:    Total Timed Code Tx Minutes 28 2  1     Total Treatment Minutes 38        Charge Justification:  (59183) THERAPEUTIC EXERCISE - Provided verbal/tactile cueing for activities related to strengthening, flexibility, endurance, ROM performed to prevent loss of range of motion, maintain or improve muscular strength or increase flexibility, following either an injury or surgery.   (14892) HOME EXERCISE PROGRAM - Reviewed/Progressed HEP activities related to strengthening, flexibility, endurance, ROM performed to prevent loss of range of motion, maintain or improve muscular strength or increase flexibility, following either an injury or surgery.  (74490) NEUROMUSCULAR RE-EDUCATION - Therapeutic procedure, 1 or more areas, each 15 minutes; neuromuscular reeducation of movement, balance, coordination,

## 2024-08-12 ENCOUNTER — HOSPITAL ENCOUNTER (OUTPATIENT)
Dept: PHYSICAL THERAPY | Age: 19
Setting detail: THERAPIES SERIES
Discharge: HOME | End: 2024-08-12
Payer: COMMERCIAL

## 2024-08-12 PROCEDURE — 97110 THERAPEUTIC EXERCISES: CPT

## 2024-08-12 PROCEDURE — 97112 NEUROMUSCULAR REEDUCATION: CPT

## 2024-08-12 PROCEDURE — 97140 MANUAL THERAPY 1/> REGIONS: CPT

## 2024-08-12 PROCEDURE — 97530 THERAPEUTIC ACTIVITIES: CPT

## 2024-08-12 NOTE — FLOWSHEET NOTE
[facility] - Outpatient Rehabilitation and Therapy [address], [place] [address] office: [phone] fax: [phone]           Physical Therapy: TREATMENT/PROGRESS NOTE   Patient: Laura Lawson (18 y.o. female)   Examination Date: 2024   :  2005 MRN: [id number]   Visit #: 10   Insurance Allowable Auth Needed   20 []Yes    [x]No    Insurance: Payor: [employer] / Plan: MEDICAL MUTUAL [address] / Product Type: *No Product type* /   Insurance ID: [id number] - (Commercial)  Secondary Insurance (if applicable):    Treatment Diagnosis:     ICD-10-CM    1. Left hip pain  M25.552       2. 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  No further screening warranted    Preferred Language for Healthcare:   [x] English       [] other:    SUBJECTIVE EXAMINATION     Patient stated complaint: Pt. Notes that her left hip is better from DN and stretching more. Pt reports medial knee continues to be more painful than knee. Knee pain is worst with prolonged standing in one place >2 hours at work.       Test used Initial score  2024   Pain Summary VAS 2-7/10 2/ hip   4/10 knee   Functional questionnaire LEFS 61/80; 24% disability    Other:                OBJECTIVE

## 2024-08-12 NOTE — FLOWSHEET NOTE
order to prevent re-injury.   [] Progressing: [] Met: [] Not Met: [] Adjusted  2. Patient will have a decrease in pain to <2/10 to facilitate improvement in movement, function, and ADLs as indicated by Functional Deficits.  [] Progressing: [] Met: [] Not Met: [] Adjusted    Long Term Goals: To be achieved in: 8 weeks  1. Disability index score of 14% or less for the SPADI to assist with reaching prior level of function with activities such as reaching and lifting overhead.  [] Progressing: [] Met: [] Not Met: [] Adjusted  2. Patient will demonstrate increased AROM of functional IR to equal L UE without pain to allow for proper joint functioning to enable patient to perform all lifting activities without increased pain.   [] Progressing: [] Met: [] Not Met: [] Adjusted  3. Patient will demonstrate increased Strength of L UE to at least 4+/5 throughout without pain to allow for proper functional mobility to enable patient to return to strength training to participate in all basketball activities without restriction.   [] Progressing: [] Met: [] Not Met: [] Adjusted  4. Patient will return to playing basketball and weight lifting without increased symptoms or restriction.   [] Progressing: [] Met: [] Not Met: [] Adjusted     Overall Progression Towards Functional goals/ Treatment Progress Update:  [] Patient is progressing as expected towards functional goals listed.    [] Progression is slowed due to complexities/Impairments listed.  [] Progression has been slowed due to co-morbidities.  [x] Plan just implemented, too soon (<30days) to assess goals progression   [] Goals require adjustment due to lack of progress  [] Patient is not progressing as expected and requires additional follow up with physician  [] Other:     TREATMENT PLAN     Frequency/Duration: 1-2x/week for 8 weeks for the following treatment interventions:    Interventions:  Therapeutic Exercise (24501) including: strength training, ROM, and functional

## 2024-08-14 ENCOUNTER — HOSPITAL ENCOUNTER (OUTPATIENT)
Dept: PHYSICAL THERAPY | Age: 19
Setting detail: THERAPIES SERIES
Discharge: HOME | End: 2024-08-14
Payer: COMMERCIAL

## 2024-08-14 ENCOUNTER — APPOINTMENT (OUTPATIENT)
Dept: PHYSICAL THERAPY | Age: 19
End: 2024-08-14
Payer: COMMERCIAL

## 2024-08-14 PROCEDURE — 97112 NEUROMUSCULAR REEDUCATION: CPT

## 2024-08-14 PROCEDURE — 97110 THERAPEUTIC EXERCISES: CPT

## 2024-08-14 PROCEDURE — 97140 MANUAL THERAPY 1/> REGIONS: CPT

## 2024-08-14 NOTE — FLOWSHEET NOTE
[facility] - Outpatient Rehabilitation and Therapy [address], [place] [address] office: [phone] fax: [phone]           Physical Therapy: TREATMENT/PROGRESS NOTE   Patient: Laura Lawson (18 y.o. female)   Examination Date: 2024   :  2005 MRN: [id number]   Visit #: 12   Insurance Allowable Auth Needed   20 []Yes    [x]No    Insurance: Payor: MED[employer]UAL / Plan: MEDICAL M[employer]UAL [address] / Product Type: *No Product type* /   Insurance ID: [id number] - (Commercial)  Secondary Insurance (if applicable):    Treatment Diagnosis:     ICD-10-CM    1. Left hip pain  M25.552       2. 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  No further screening warranted    Preferred Language for Healthcare:   [x] English       [] other:    SUBJECTIVE EXAMINATION     Patient stated complaint: Thinks the DN has been helping.       Test used Initial score  2024   Pain Summary VAS 2-7/10 2/ hip   4/10 knee   Functional questionnaire LEFS 61/80; 24% disability    Other:                OBJECTIVE EXAMINATION     24     PROM AROM    L R L R   Hip Flexion   110 *pn 110   Hip Abduction       Hip ER   Dec *mild pn WNL   Hip IR   Dec *pn Mild dec   Knee Flexion   140

## 2024-08-15 ENCOUNTER — OFFICE VISIT (OUTPATIENT)
Dept: ORTHOPEDIC SURGERY | Age: 19
End: 2024-08-15

## 2024-08-15 VITALS — WEIGHT: 175.04 LBS | BODY MASS INDEX: 23.71 KG/M2 | HEIGHT: 72 IN

## 2024-08-15 DIAGNOSIS — M25.859 FEMORAL ACETABULAR IMPINGEMENT: Primary | ICD-10-CM

## 2024-08-15 DIAGNOSIS — S73.192A TEAR OF LEFT ACETABULAR LABRUM, INITIAL ENCOUNTER: ICD-10-CM

## 2024-08-15 NOTE — PROGRESS NOTES
Chief Complaint:   Chief Complaint   Patient presents with    Shoulder Pain     right, overall improved, has been doing stretching and restrictions with basketball camp went well    Hip Pain     left, s/p PRP, doing much better, PRP and PT helpful    Knee Pain     bilat, knees are sore at this time, doing PT, using KT tape          History of Present Illness:       Patient is a 18 y.o. female returns follow up for the above complaint. The patient was last seen approximately 5 weeksago for the left hip. The symptoms are improving since the last visit. The patient has had no further testing for the problem.      Status post ultrasound-guided intra-articular [id number] left hip 7/11/2024    W OM AC 12 prior 29    No mechanical symptoms no subjective instability    She is pleased with the improvement    No new injuries no new events    She continues with PT     Past Medical History:      No past medical history on file.     Present Medications:         Current Outpatient Medications   Medication Sig Dispense Refill    acetaminophen (TYLENOL) 500 MG tablet Take 1 tablet by mouth      saccharomyces boulardii (FLORASTOR) 250 MG capsule Take 1 capsule by mouth      meloxicam (MOBIC) 15 MG tablet Take 1 tablet by mouth daily For 2 weeks then daily as needed thereafter 30 tablet 2    cetirizine (ZYRTEC) 10 MG tablet       dimenhyDRINATE (DRAMAMINE) 50 MG tablet Take by mouth      loratadine (CLARITIN) 10 MG tablet Take 1 tablet by mouth daily      pantoprazole (PROTONIX) 40 MG tablet TAKE 1 TABLET BY MOUTH EVERY DAY FOR 30 DAYS      rifAXIMin (XIFAXAN) 550 MG tablet Take 1 tablet by mouth 3 times daily       No current facility-administered medications for this visit.         Allergies:      No Known Allergies        Review of Systems:    Pertinent items are noted in HPI   .      Vital Signs:    There were no vitals filed for this visit.     General Exam:     Constitutional: Patient is adequately groomed with no

## 2024-08-16 ENCOUNTER — HOSPITAL ENCOUNTER (OUTPATIENT)
Dept: PHYSICAL THERAPY | Age: 19
Setting detail: THERAPIES SERIES
Discharge: HOME | End: 2024-08-16
Payer: COMMERCIAL

## 2024-08-16 PROCEDURE — 97112 NEUROMUSCULAR REEDUCATION: CPT

## 2024-08-16 PROCEDURE — 97530 THERAPEUTIC ACTIVITIES: CPT

## 2024-08-16 PROCEDURE — 97110 THERAPEUTIC EXERCISES: CPT

## 2024-08-16 PROCEDURE — 97140 MANUAL THERAPY 1/> REGIONS: CPT

## 2024-08-16 NOTE — FLOWSHEET NOTE
when she goes back to [facility] next week.       Medical Necessity Documentation:  I certify that this patient meets the below criteria necessary for medical necessity for care and/or justification of therapy services:  The patient has functional impairments and/or activity limitations and would benefit from continued outpatient therapy services to address the deficits outlined in the patients goals    Return to Play: NA    Prognosis for POC: [x] Good [] Fair  [] Poor    Patient requires continued skilled intervention: [x] Yes  [] No      CHARGE CAPTURE     PT CHARGE GRID   CPT Code (TIMED) minutes # CPT Code (UNTIMED) #     Therex (73522)  20 1  EVAL:LOW (56210 - Typically 20 minutes face-to-face)     Neuromusc. Re-ed (08575) 15 1  Re-Eval (63264)     Manual (25464) 10 1  Estim Unattended (25247)     Ther. Act (12927) 15 1  Mech. Traction (68910)     Gait (46729)    Dry Needle 1-2 muscle (31411)     Aquatic Therex (95942)    Dry Needle 3+ muscle (20561)     Iontophoresis (86300)    VASO (19207)     Ultrasound (75633)    Group Therapy (80373)     Estim Attended (63608)    Canalith Repositioning (86958)     Other:    Other:    Total Timed Code Tx Minutes  60 4       Total Treatment Minutes 60        Charge Justification:  (26600) THERAPEUTIC EXERCISE - Provided verbal/tactile cueing for activities related to strengthening, flexibility, endurance, ROM performed to prevent loss of range of motion, maintain or improve muscular strength or increase flexibility, following either an injury or surgery.   (88849) NEUROMUSCULAR RE-EDUCATION - Therapeutic procedure, 1 or more areas, each 15 minutes; neuromuscular reeducation of movement, balance, coordination, kinesthetic sense, posture, and/or proprioception for sitting and/or standing activities  (67667) MANUAL THERAPY -  Manual therapy techniques, 1 or more regions, each 15 minutes (Mobilization/manipulation, manual lymphatic drainage, manual traction) for the purpose of

## 2024-08-19 ENCOUNTER — HOSPITAL ENCOUNTER (OUTPATIENT)
Dept: PHYSICAL THERAPY | Age: 19
Setting detail: THERAPIES SERIES
Discharge: HOME | End: 2024-08-19
Payer: COMMERCIAL

## 2024-08-19 PROCEDURE — 97530 THERAPEUTIC ACTIVITIES: CPT

## 2024-08-19 PROCEDURE — 97112 NEUROMUSCULAR REEDUCATION: CPT

## 2024-08-19 PROCEDURE — 97110 THERAPEUTIC EXERCISES: CPT

## 2024-08-19 PROCEDURE — 97032 APPL MODALITY 1+ESTIM EA 15: CPT

## 2024-08-19 PROCEDURE — 97140 MANUAL THERAPY 1/> REGIONS: CPT

## 2024-08-19 NOTE — PLAN OF CARE
[facility] - Outpatient Rehabilitation and Therapy [address], [place] [address] office: [phone] fax: [phone]     Physical Therapy Re-Certification Plan of Care    Dear Felice Ervin  ,    We had the pleasure of treating the following patient for physical therapy services at [facility] Therapy. A summary of our findings can be found in the updated assessment below.  This includes our plan of care.  If you have any questions or concerns regarding these findings, please do not hesitate to contact me at the office phone number checked above.  Thank you for the referral.     Physician Signature:________________________________Date:__________________  By signing above (or electronic signature), therapist's plan is approved by physician      Functional Outcome:   Laura Lawson 2005 continues to present with functional deficits in strength symmetry, endurance of strength, and eccentric control  limiting ability with transitions between positions and running and jumping  .  During therapy this date, patient required visual cueing, tactile cueing, modification of technique, and progression of exercises and program for exercise progression, improving proper muscle recruitment and activation/motor control patterns, improving soft tissue extensibility, static and dynamic balance, and kinesthetic sense and proprioception. Patient will continue to benefit from ongoing evaluation and advanced clinical decision from a Physical Therapist to improve pain control, neuromuscular control, endurance, normalization of gait, and balance and proprioception to safely return to advanced sport activity without symptoms or restrictions.    Overall Response to Treatment:  Patient is responding well to treatment and improvement is noted with regards to goals  Patient has met 5 of 8 goals.    Total Visits: 14     Recommendation:    [x] Continue PT 1-2x / wk for

## 2024-08-20 ENCOUNTER — OFFICE VISIT (OUTPATIENT)
Dept: ORTHOPEDIC SURGERY | Age: 19
End: 2024-08-20
Payer: COMMERCIAL

## 2024-08-20 VITALS — HEIGHT: 72 IN | BODY MASS INDEX: 23.7 KG/M2 | WEIGHT: 175 LBS

## 2024-08-20 DIAGNOSIS — M25.561 ACUTE PAIN OF RIGHT KNEE: Primary | ICD-10-CM

## 2024-08-20 DIAGNOSIS — S83.281A ACUTE LATERAL MENISCUS TEAR OF RIGHT KNEE, INITIAL ENCOUNTER: ICD-10-CM

## 2024-08-20 PROCEDURE — 1036F TOBACCO NON-USER: CPT | Performed by: INTERNAL MEDICINE

## 2024-08-20 PROCEDURE — G8420 CALC BMI NORM PARAMETERS: HCPCS | Performed by: INTERNAL MEDICINE

## 2024-08-20 PROCEDURE — 99214 OFFICE O/P EST MOD 30 MIN: CPT | Performed by: INTERNAL MEDICINE

## 2024-08-20 PROCEDURE — G8427 DOCREV CUR MEDS BY ELIG CLIN: HCPCS | Performed by: INTERNAL MEDICINE

## 2024-08-20 NOTE — PROGRESS NOTES
Procedures    MRI KNEE RIGHT WO CONTRAST     STAT SCHEDULING AND STAT READ     Standing Status:   Future     Standing Expiration Date:   8/20/2025     Scheduling Instructions:      Maria Fernanda KIM, pt will call to schedule, [phone].      Maria Fernanda will obtain auth and fwd to your facility      Pt advised to f/u in clinic 1-2 days after MRI for results.     Order Specific Question:   Reason for exam:     Answer:   r/o LMT  STAT SCHEDULING AND STAT READ     Order Specific Question:   What is the sedation requirement?     Answer:   None           Disclaimer:    \"This note was dictated with voice recognition software. Though review and correction are routine, we apologize for any errors.\"

## 2024-08-21 ENCOUNTER — HOSPITAL ENCOUNTER (OUTPATIENT)
Dept: MRI IMAGING | Age: 19
Discharge: HOME | End: 2024-08-21
Attending: INTERNAL MEDICINE
Payer: COMMERCIAL

## 2024-08-21 ENCOUNTER — HOSPITAL ENCOUNTER (OUTPATIENT)
Dept: PHYSICAL THERAPY | Age: 19
Setting detail: THERAPIES SERIES
Discharge: HOME | End: 2024-08-21
Payer: COMMERCIAL

## 2024-08-21 DIAGNOSIS — M25.561 ACUTE PAIN OF RIGHT KNEE: ICD-10-CM

## 2024-08-21 DIAGNOSIS — S83.281A ACUTE LATERAL MENISCUS TEAR OF RIGHT KNEE, INITIAL ENCOUNTER: ICD-10-CM

## 2024-08-21 PROCEDURE — 97140 MANUAL THERAPY 1/> REGIONS: CPT

## 2024-08-21 PROCEDURE — 97530 THERAPEUTIC ACTIVITIES: CPT

## 2024-08-21 PROCEDURE — 97110 THERAPEUTIC EXERCISES: CPT

## 2024-08-21 PROCEDURE — 97112 NEUROMUSCULAR REEDUCATION: CPT

## 2024-08-21 PROCEDURE — 73721 MRI JNT OF LWR EXTRE W/O DYE: CPT

## 2024-08-21 NOTE — FLOWSHEET NOTE
scheduled/recommended follow up visits, this note will serve as a discharge from care along with the most recent update on progress.    Ortho Evaluation

## 2024-08-21 NOTE — FLOWSHEET NOTE
[facility] [address], [place] [address] office: [phone] fax: [phone]         Physical Therapy: TREATMENT/PROGRESS NOTE   Patient: Laura Lawson (18 y.o. female)   Examination Date: 2024   :  2005 MRN: [id number]   Visit #: 7   Insurance Allowable Auth Needed   20 PCY []Yes    [x]No    Insurance: Payor: [employer] / Plan: [employer] [address] / Product Type: *No Product type* /   Insurance ID: [id number] - (Commercial)  Secondary Insurance (if applicable):    Treatment Diagnosis:     ICD-10-CM    1. Decreased range of motion  M25.60       2. Pain  R52       3. Weakness  R53.1          Medical Diagnosis:  Right shoulder pain, unspecified chronicity [M25.511]  Strain of right rotator cuff capsule, initial encounter [S46.011A]   Referring Physician: Felice Ervin*  PCP: Zeynep Chua MD     Plan of care signed (Y/N): Y    Date of Patient follow up with Physician: 8/15/24     Plan of Care Report: NO  POC update due: (10 visits /OR AUTH LIMITS, whichever is less)  2024                                             Precautions/ Contra-indications:                                                                                         Latex allergy:  NO  Pacemaker:    NO  Contraindications for Manipulation: None  Date of Surgery: N/A  Other:     Red Flags:  None     C-SSRS Triggered by Intake questionnaire:   Patient answered 'NO' to both behavioral questions on intake.  No further screening warranted    Preferred Language for Healthcare:   [x] English       [] other:    SUBJECTIVE EXAMINATION     Subjective: Pt reports R shoulder pain is improved. Pt states that she continues to have pain with occasional [place] activities and occasionally with planks/weight bearing activities. Pt is leaving this weekend for college (at [employer]).     Patient stated complaint: Pt reports insidious onset ~